# Patient Record
Sex: FEMALE | Race: WHITE | Employment: UNEMPLOYED | URBAN - METROPOLITAN AREA
[De-identification: names, ages, dates, MRNs, and addresses within clinical notes are randomized per-mention and may not be internally consistent; named-entity substitution may affect disease eponyms.]

---

## 2017-07-27 PROBLEM — R20.0 NUMBNESS AND TINGLING OF RIGHT SIDE OF FACE: Status: ACTIVE | Noted: 2017-07-27

## 2017-07-27 PROBLEM — R51.9 RIGHT FACIAL PAIN: Status: ACTIVE | Noted: 2017-07-27

## 2017-07-27 PROBLEM — R49.0 HOARSENESS: Status: ACTIVE | Noted: 2017-07-27

## 2017-07-27 PROBLEM — H93.13: Status: ACTIVE | Noted: 2017-07-27

## 2017-07-27 PROBLEM — R20.2 NUMBNESS AND TINGLING OF RIGHT SIDE OF FACE: Status: ACTIVE | Noted: 2017-07-27

## 2017-07-27 PROCEDURE — 86618 LYME DISEASE ANTIBODY: CPT | Performed by: OTHER

## 2017-07-27 PROCEDURE — 83921 ORGANIC ACID SINGLE QUANT: CPT | Performed by: OTHER

## 2017-07-27 PROCEDURE — 82607 VITAMIN B-12: CPT | Performed by: OTHER

## 2017-07-27 PROCEDURE — 84425 ASSAY OF VITAMIN B-1: CPT | Performed by: OTHER

## 2017-07-27 PROCEDURE — 82746 ASSAY OF FOLIC ACID SERUM: CPT | Performed by: OTHER

## 2017-07-27 PROCEDURE — 80156 ASSAY CARBAMAZEPINE TOTAL: CPT | Performed by: FAMILY MEDICINE

## 2017-07-27 PROCEDURE — 80156 ASSAY CARBAMAZEPINE TOTAL: CPT | Performed by: OTHER

## 2017-08-07 PROBLEM — E53.8 LOW FOLIC ACID: Status: ACTIVE | Noted: 2017-08-07

## 2018-08-02 PROCEDURE — 82746 ASSAY OF FOLIC ACID SERUM: CPT | Performed by: FAMILY MEDICINE

## 2018-08-02 PROCEDURE — 82607 VITAMIN B-12: CPT | Performed by: FAMILY MEDICINE

## 2019-10-04 PROBLEM — E11.9 TYPE 2 DIABETES MELLITUS WITHOUT COMPLICATION, WITHOUT LONG-TERM CURRENT USE OF INSULIN (HCC): Status: ACTIVE | Noted: 2019-10-04

## 2019-10-04 PROBLEM — E11.65 TYPE 2 DIABETES MELLITUS WITH HYPERGLYCEMIA, WITHOUT LONG-TERM CURRENT USE OF INSULIN (HCC): Status: ACTIVE | Noted: 2019-10-04

## 2019-12-19 ENCOUNTER — OFFICE VISIT (OUTPATIENT)
Dept: PODIATRY CLINIC | Facility: CLINIC | Age: 59
End: 2019-12-19
Payer: COMMERCIAL

## 2019-12-19 DIAGNOSIS — M19.172 POST-TRAUMATIC OSTEOARTHRITIS OF LEFT FOOT: ICD-10-CM

## 2019-12-19 DIAGNOSIS — E11.65 TYPE 2 DIABETES MELLITUS WITH HYPERGLYCEMIA, WITHOUT LONG-TERM CURRENT USE OF INSULIN (HCC): ICD-10-CM

## 2019-12-19 DIAGNOSIS — M79.671 BILATERAL FOOT PAIN: Primary | ICD-10-CM

## 2019-12-19 DIAGNOSIS — M79.672 BILATERAL FOOT PAIN: Primary | ICD-10-CM

## 2019-12-19 PROCEDURE — 73620 X-RAY EXAM OF FOOT: CPT | Performed by: PODIATRIST

## 2019-12-19 PROCEDURE — 99213 OFFICE O/P EST LOW 20 MIN: CPT | Performed by: PODIATRIST

## 2019-12-19 NOTE — PROGRESS NOTES
Leatha Fuentes is a 59year old female. Patient presents with:   Foot Pain: Patient present with pain in left foot - pain 7/10  Orthotic Status: Patient needs new orthotics        HPI:     Presents today she has had this left foot pain develop over kevin type diabetes mellitus without mention of complication, not stated as uncontrolled    • Unspecified disorder of thyroid    • Unspecified sleep apnea PSG EDWARD 12-26-10 TX 11-20-13    AHI 12.8 SaO2 aleta 64% CPAP 8 Premier      Past Surgical History:   Pro exertion  GI: denies abdominal pain and denies heartburn  NEURO: denies headaches  MUSCULO: Patient acknowledges arthritis    EXAM:   LMP 03/26/2013   Physical Exam  GENERAL: well developed, well nourished, in no apparent distress  EXTREMITIES:   1.  Integu

## 2019-12-26 ENCOUNTER — OFFICE VISIT (OUTPATIENT)
Dept: PODIATRY CLINIC | Facility: CLINIC | Age: 59
End: 2019-12-26
Payer: COMMERCIAL

## 2019-12-26 DIAGNOSIS — M79.671 BILATERAL FOOT PAIN: ICD-10-CM

## 2019-12-26 DIAGNOSIS — M79.672 BILATERAL FOOT PAIN: ICD-10-CM

## 2019-12-26 DIAGNOSIS — E11.65 TYPE 2 DIABETES MELLITUS WITH HYPERGLYCEMIA, WITHOUT LONG-TERM CURRENT USE OF INSULIN (HCC): Primary | ICD-10-CM

## 2019-12-26 DIAGNOSIS — M19.172 POST-TRAUMATIC OSTEOARTHRITIS OF LEFT FOOT: ICD-10-CM

## 2019-12-26 PROCEDURE — L3000 FT INSERT UCB BERKELEY SHELL: HCPCS | Performed by: PODIATRIST

## 2019-12-26 PROCEDURE — 29799 UNLISTED PX CASTING/STRPG: CPT | Performed by: PODIATRIST

## 2019-12-26 NOTE — PROGRESS NOTES
Og Stack is a 62year old female. Patient presents with:  Orthotic Status: Patient is here to be casted for custom orthotics. Patient spoke with her insurance and agrees.         HPI:     .  Here for follow-up having pain secondary to arthritis i AHI 12.8 SaO2 aleta 64% CPAP 8 Premier      Past Surgical History:   Procedure Laterality Date   • BENIGN BIOPSY LEFT  2008   • D & C  08/93    SAB   • FOOT/TOES SURGERY PROC UNLISTED  2009    broken foot pinned   • HX BREAST CANCER  2006    DCIS   • HY developed, well nourished, in no apparent distress  EXTREMITIES:   1. Integument: Normal skin temperature and turgor  2.  Vascular: Dorsalis pedis two out of four bilateral and posterior tibial pulses two out of   four bilateral, capillary refill normal.

## 2020-01-24 ENCOUNTER — TELEPHONE (OUTPATIENT)
Dept: PODIATRY CLINIC | Facility: CLINIC | Age: 60
End: 2020-01-24

## 2020-01-24 NOTE — TELEPHONE ENCOUNTER
Contacted patient to let her know orthotics are in the office. She will stop by the office and pick them up.

## 2020-01-29 ENCOUNTER — TELEPHONE (OUTPATIENT)
Dept: PODIATRY CLINIC | Facility: CLINIC | Age: 60
End: 2020-01-29

## 2021-08-25 ENCOUNTER — APPOINTMENT (OUTPATIENT)
Dept: CT IMAGING | Facility: HOSPITAL | Age: 61
End: 2021-08-25
Attending: EMERGENCY MEDICINE
Payer: COMMERCIAL

## 2021-08-25 ENCOUNTER — APPOINTMENT (OUTPATIENT)
Dept: GENERAL RADIOLOGY | Facility: HOSPITAL | Age: 61
End: 2021-08-25
Attending: EMERGENCY MEDICINE
Payer: COMMERCIAL

## 2021-08-25 ENCOUNTER — HOSPITAL ENCOUNTER (EMERGENCY)
Facility: HOSPITAL | Age: 61
Discharge: HOME | End: 2021-08-25
Attending: EMERGENCY MEDICINE
Payer: COMMERCIAL

## 2021-08-25 VITALS
BODY MASS INDEX: 45.99 KG/M2 | SYSTOLIC BLOOD PRESSURE: 136 MMHG | TEMPERATURE: 98 F | DIASTOLIC BLOOD PRESSURE: 68 MMHG | RESPIRATION RATE: 19 BRPM | HEIGHT: 67 IN | OXYGEN SATURATION: 96 % | HEART RATE: 66 BPM | WEIGHT: 293 LBS

## 2021-08-25 DIAGNOSIS — I48.92 ATRIAL FLUTTER WITH RAPID VENTRICULAR RESPONSE (HCC): Primary | ICD-10-CM

## 2021-08-25 LAB
ALBUMIN SERPL-MCNC: 3.2 G/DL (ref 3.4–5)
ALBUMIN/GLOB SERPL: 0.8 {RATIO} (ref 1–2)
ALP LIVER SERPL-CCNC: 79 U/L
ALT SERPL-CCNC: 32 U/L
ANION GAP SERPL CALC-SCNC: 3 MMOL/L (ref 0–18)
APTT PPP: 23.5 SECONDS (ref 25.4–36.1)
AST SERPL-CCNC: 17 U/L (ref 15–37)
ATRIAL RATE: 258 BPM
ATRIAL RATE: 71 BPM
BASOPHILS # BLD AUTO: 0.09 X10(3) UL (ref 0–0.2)
BASOPHILS NFR BLD AUTO: 1.1 %
BILIRUB SERPL-MCNC: 0.4 MG/DL (ref 0.1–2)
BUN BLD-MCNC: 13 MG/DL (ref 7–18)
CALCIUM BLD-MCNC: 8.7 MG/DL (ref 8.5–10.1)
CHLORIDE SERPL-SCNC: 109 MMOL/L (ref 98–112)
CO2 SERPL-SCNC: 25 MMOL/L (ref 21–32)
CREAT BLD-MCNC: 0.73 MG/DL
D-DIMER: 0.76 UG/ML FEU (ref ?–0.62)
EOSINOPHIL # BLD AUTO: 0.43 X10(3) UL (ref 0–0.7)
EOSINOPHIL NFR BLD AUTO: 5.2 %
ERYTHROCYTE [DISTWIDTH] IN BLOOD BY AUTOMATED COUNT: 13.1 %
GLOBULIN PLAS-MCNC: 3.8 G/DL (ref 2.8–4.4)
GLUCOSE BLD-MCNC: 198 MG/DL (ref 70–99)
HCT VFR BLD AUTO: 46.2 %
HGB BLD-MCNC: 15.6 G/DL
IMM GRANULOCYTES # BLD AUTO: 0.02 X10(3) UL (ref 0–1)
IMM GRANULOCYTES NFR BLD: 0.2 %
INR BLD: 0.92 (ref 0.89–1.11)
LYMPHOCYTES # BLD AUTO: 2.28 X10(3) UL (ref 1–4)
LYMPHOCYTES NFR BLD AUTO: 27.8 %
M PROTEIN MFR SERPL ELPH: 7 G/DL (ref 6.4–8.2)
MCH RBC QN AUTO: 30.8 PG (ref 26–34)
MCHC RBC AUTO-ENTMCNC: 33.8 G/DL (ref 31–37)
MCV RBC AUTO: 91.3 FL
MONOCYTES # BLD AUTO: 0.7 X10(3) UL (ref 0.1–1)
MONOCYTES NFR BLD AUTO: 8.5 %
NEUTROPHILS # BLD AUTO: 4.69 X10 (3) UL (ref 1.5–7.7)
NEUTROPHILS # BLD AUTO: 4.69 X10(3) UL (ref 1.5–7.7)
NEUTROPHILS NFR BLD AUTO: 57.2 %
NT-PROBNP SERPL-MCNC: 514 PG/ML (ref ?–125)
OSMOLALITY SERPL CALC.SUM OF ELEC: 290 MOSM/KG (ref 275–295)
P AXIS: 37 DEGREES
P AXIS: 47 DEGREES
P-R INTERVAL: 140 MS
PLATELET # BLD AUTO: 177 10(3)UL (ref 150–450)
POTASSIUM SERPL-SCNC: 4.3 MMOL/L (ref 3.5–5.1)
PSA SERPL DL<=0.01 NG/ML-MCNC: 12.5 SECONDS (ref 12.2–14.5)
Q-T INTERVAL: 308 MS
Q-T INTERVAL: 398 MS
QRS DURATION: 88 MS
QRS DURATION: 90 MS
QTC CALCULATION (BEZET): 432 MS
QTC CALCULATION (BEZET): 451 MS
R AXIS: -9 DEGREES
R AXIS: 1 DEGREES
RBC # BLD AUTO: 5.06 X10(6)UL
SARS-COV-2 RNA RESP QL NAA+PROBE: NOT DETECTED
SODIUM SERPL-SCNC: 137 MMOL/L (ref 136–145)
T AXIS: 16 DEGREES
T AXIS: 45 DEGREES
TROPONIN I SERPL-MCNC: <0.045 NG/ML (ref ?–0.04)
TSI SER-ACNC: 2.93 MIU/ML (ref 0.36–3.74)
VENTRICULAR RATE: 129 BPM
VENTRICULAR RATE: 71 BPM
WBC # BLD AUTO: 8.2 X10(3) UL (ref 4–11)

## 2021-08-25 PROCEDURE — 70450 CT HEAD/BRAIN W/O DYE: CPT | Performed by: EMERGENCY MEDICINE

## 2021-08-25 PROCEDURE — 85025 COMPLETE CBC W/AUTO DIFF WBC: CPT | Performed by: EMERGENCY MEDICINE

## 2021-08-25 PROCEDURE — 84484 ASSAY OF TROPONIN QUANT: CPT | Performed by: EMERGENCY MEDICINE

## 2021-08-25 PROCEDURE — 84443 ASSAY THYROID STIM HORMONE: CPT | Performed by: EMERGENCY MEDICINE

## 2021-08-25 PROCEDURE — 99291 CRITICAL CARE FIRST HOUR: CPT | Performed by: EMERGENCY MEDICINE

## 2021-08-25 PROCEDURE — 83880 ASSAY OF NATRIURETIC PEPTIDE: CPT | Performed by: EMERGENCY MEDICINE

## 2021-08-25 PROCEDURE — 93005 ELECTROCARDIOGRAM TRACING: CPT

## 2021-08-25 PROCEDURE — 71260 CT THORAX DX C+: CPT | Performed by: EMERGENCY MEDICINE

## 2021-08-25 PROCEDURE — 80053 COMPREHEN METABOLIC PANEL: CPT | Performed by: EMERGENCY MEDICINE

## 2021-08-25 PROCEDURE — 93010 ELECTROCARDIOGRAM REPORT: CPT | Performed by: EMERGENCY MEDICINE

## 2021-08-25 PROCEDURE — 96366 THER/PROPH/DIAG IV INF ADDON: CPT | Performed by: EMERGENCY MEDICINE

## 2021-08-25 PROCEDURE — 96365 THER/PROPH/DIAG IV INF INIT: CPT

## 2021-08-25 PROCEDURE — 85379 FIBRIN DEGRADATION QUANT: CPT | Performed by: EMERGENCY MEDICINE

## 2021-08-25 PROCEDURE — 85730 THROMBOPLASTIN TIME PARTIAL: CPT | Performed by: EMERGENCY MEDICINE

## 2021-08-25 PROCEDURE — 85610 PROTHROMBIN TIME: CPT | Performed by: EMERGENCY MEDICINE

## 2021-08-25 RX ORDER — DILTIAZEM HYDROCHLORIDE 180 MG/1
180 CAPSULE, COATED, EXTENDED RELEASE ORAL DAILY
Qty: 30 CAPSULE | Refills: 0 | Status: SHIPPED | OUTPATIENT
Start: 2021-08-25 | End: 2021-09-02

## 2021-08-25 RX ORDER — LEVOTHYROXINE SODIUM 0.15 MG/1
150 TABLET ORAL
COMMUNITY

## 2021-08-25 RX ORDER — ONDANSETRON 2 MG/ML
4 INJECTION INTRAMUSCULAR; INTRAVENOUS EVERY 4 HOURS PRN
Status: CANCELLED | OUTPATIENT
Start: 2021-08-25 | End: 2021-08-25

## 2021-08-25 RX ORDER — SODIUM FLUORIDE 5 MG/ML
1 PASTE, DENTIFRICE DENTAL AS DIRECTED
COMMUNITY
Start: 2021-08-14

## 2021-08-25 RX ORDER — ACETAMINOPHEN 500 MG
1000 TABLET ORAL EVERY 6 HOURS PRN
COMMUNITY

## 2021-08-25 NOTE — ED QUICK NOTES
Round on pt. Updated on poc. Will hold on CXR and CT at this time. Per MD, may order CTA pending dimer result.

## 2021-08-25 NOTE — CM/SW NOTE
Received call for Eliquis RX for new onset afib - patient also being prescribed Cardizem - ED MD sent both RXs to [facility] - per pharmacy, there is $0 copay for either medication - meds to be delivered to bedside

## 2021-08-25 NOTE — ED QUICK NOTES
augustd at bs with trace dischg poc; pt verbalized understanding of rx x two and seeing cardiogogist.

## 2021-08-25 NOTE — ED PROVIDER NOTES
Patient Seen in: [facility] Emergency Department      History   Patient presents with:  Arrythmia/Palpitations    Stated Complaint: HEART FLUTTER/PALPITATIONS    HPI/Subjective:   HPI    17-year-old female with past medical history of diabetes, hype Social History    Tobacco Use      Smoking status: Never Smoker      Smokeless tobacco: Never Used    Alcohol use: Yes      Alcohol/week: 0.0 - 1.0 standard drinks      Comment: RARE    Drug use:  No             Review of Systems    Positive ED Course     Labs Reviewed   COMP METABOLIC PANEL (14) - Abnormal; Notable for the following components:       Result Value    Glucose 198 (*)     Albumin 3.2 (*)     A/G Ratio 0.8 (*)     All other components within normal limits   PRO BETA MAURA transcribed by Technologist)  The patient complaints of headache and heart palpitation. FINDINGS:  VENTRICLES/SULCI:  Ventricles and sulci are normal in size. INTRACRANIAL:  There are no abnormal extraaxial fluid collections.   There is no midline shift embolism.  2. There is no acute abnormality in the chest.   Dictated by (CST): Adriel Guerrero MD on 8/25/2021 at 11:42 AM     Finalized by (CST): Adriel Guerrero MD on 8/25/2021 at 11:45 AM              MDM      18-year-old female presents today for palpitatio limits. Patient still feels comfortable with discharge. She will follow up with cardiology in the next week and return for any new or worsening symptoms. Patient was provided with diltiazem and Eliquis in the emergency department.     A total of 30 minut

## 2021-09-09 ENCOUNTER — OFFICE VISIT (OUTPATIENT)
Dept: PODIATRY CLINIC | Facility: CLINIC | Age: 61
End: 2021-09-09
Payer: COMMERCIAL

## 2021-09-09 DIAGNOSIS — E11.65 TYPE 2 DIABETES MELLITUS WITH HYPERGLYCEMIA, WITHOUT LONG-TERM CURRENT USE OF INSULIN (HCC): ICD-10-CM

## 2021-09-09 DIAGNOSIS — M79.671 BILATERAL FOOT PAIN: ICD-10-CM

## 2021-09-09 DIAGNOSIS — M79.672 BILATERAL FOOT PAIN: ICD-10-CM

## 2021-09-09 DIAGNOSIS — M19.172 POST-TRAUMATIC OSTEOARTHRITIS OF LEFT FOOT: Primary | ICD-10-CM

## 2021-09-09 PROCEDURE — L3000 FT INSERT UCB BERKELEY SHELL: HCPCS | Performed by: PODIATRIST

## 2021-09-09 PROCEDURE — 29799 UNLISTED PX CASTING/STRPG: CPT | Performed by: PODIATRIST

## 2021-09-09 NOTE — PROGRESS NOTES
Tab Jaeger is a 56year old female. Patient presents with:  Orthotic Status:  order new orthotics         HPI:   Patient returns to the clinic to be casted for functional orthotics.   At today's visit reviewed nurse's history as taken above, allerg Unspecified sleep apnea PSG EDWARD 12-26-10 TX 11-20-13    AHI 12.8 SaO2 aleta 64% CPAP 8 Premier      Past Surgical History:   Procedure Laterality Date   • BENIGN BIOPSY LEFT  2008   • D & C  08/93    SAB   • FOOT/TOES SURGERY PROC UNLISTED  2009    iwona orders for this visit:    Post-traumatic osteoarthritis of left foot    Bilateral foot pain    Type 2 diabetes mellitus with hyperglycemia, without long-term current use of insulin ([facility] [address]        Plan: Follow-up when orthotics arrive.     The patient indicate

## 2021-09-09 NOTE — PROGRESS NOTES
Patient signed waiver for Orthotics. Insurance notified patient aware of cost and coverage out of pocket if necessary.

## 2021-10-08 ENCOUNTER — TELEPHONE (OUTPATIENT)
Dept: PODIATRY CLINIC | Facility: CLINIC | Age: 61
End: 2021-10-08

## 2021-10-25 PROBLEM — I48.92 PAROXYSMAL ATRIAL FLUTTER (HCC): Status: ACTIVE | Noted: 2021-08-25

## 2022-07-12 ENCOUNTER — OFFICE VISIT (OUTPATIENT)
Dept: PODIATRY CLINIC | Facility: CLINIC | Age: 62
End: 2022-07-12
Payer: COMMERCIAL

## 2022-07-12 DIAGNOSIS — M20.12 HALLUX VALGUS OF LEFT FOOT: ICD-10-CM

## 2022-07-12 DIAGNOSIS — L84 CALLUS OF FOOT: ICD-10-CM

## 2022-07-12 DIAGNOSIS — B35.1 ONYCHOMYCOSIS: Primary | ICD-10-CM

## 2022-07-12 DIAGNOSIS — M79.672 LEFT FOOT PAIN: ICD-10-CM

## 2022-07-12 PROCEDURE — 99213 OFFICE O/P EST LOW 20 MIN: CPT | Performed by: PODIATRIST

## 2022-07-21 ENCOUNTER — LAB ENCOUNTER (OUTPATIENT)
Dept: LAB | Age: 62
End: 2022-07-21
Attending: PODIATRIST
Payer: COMMERCIAL

## 2022-07-21 ENCOUNTER — TELEPHONE (OUTPATIENT)
Dept: PODIATRY CLINIC | Facility: CLINIC | Age: 62
End: 2022-07-21

## 2022-07-21 DIAGNOSIS — B35.1 ONYCHOMYCOSIS: ICD-10-CM

## 2022-07-21 DIAGNOSIS — B35.1 ONYCHOMYCOSIS: Primary | ICD-10-CM

## 2022-07-21 LAB
ALBUMIN SERPL-MCNC: 3.4 G/DL (ref 3.4–5)
ALP LIVER SERPL-CCNC: 75 U/L
ALT SERPL-CCNC: 29 U/L
AST SERPL-CCNC: 18 U/L (ref 15–37)
BILIRUB DIRECT SERPL-MCNC: <0.1 MG/DL (ref 0–0.2)
BILIRUB SERPL-MCNC: 0.3 MG/DL (ref 0.1–2)
PROT SERPL-MCNC: 7.3 G/DL (ref 6.4–8.2)

## 2022-07-21 PROCEDURE — 36415 COLL VENOUS BLD VENIPUNCTURE: CPT

## 2022-07-21 PROCEDURE — 80076 HEPATIC FUNCTION PANEL: CPT

## 2022-07-25 ENCOUNTER — TELEPHONE (OUTPATIENT)
Dept: PODIATRY CLINIC | Facility: CLINIC | Age: 62
End: 2022-07-25

## 2022-07-25 RX ORDER — TERBINAFINE HYDROCHLORIDE 250 MG/1
250 TABLET ORAL DAILY
Qty: 45 TABLET | Refills: 0 | Status: SHIPPED | OUTPATIENT
Start: 2022-07-25

## 2022-08-04 ENCOUNTER — HOSPITAL ENCOUNTER (OUTPATIENT)
Dept: MRI IMAGING | Age: 62
Discharge: HOME | End: 2022-08-04
Attending: FAMILY MEDICINE
Payer: COMMERCIAL

## 2022-08-04 DIAGNOSIS — M54.2 CERVICALGIA: ICD-10-CM

## 2022-08-04 DIAGNOSIS — M75.102 NONTRAUMATIC TEAR OF LEFT ROTATOR CUFF, UNSPECIFIED TEAR EXTENT: ICD-10-CM

## 2022-08-04 PROCEDURE — 73221 MRI JOINT UPR EXTREM W/O DYE: CPT | Performed by: FAMILY MEDICINE

## 2022-09-19 ENCOUNTER — OFFICE VISIT (OUTPATIENT)
Dept: PODIATRY CLINIC | Facility: CLINIC | Age: 62
End: 2022-09-19
Payer: COMMERCIAL

## 2022-09-19 DIAGNOSIS — B35.1 ONYCHOMYCOSIS: Primary | ICD-10-CM

## 2022-09-19 PROCEDURE — 99213 OFFICE O/P EST LOW 20 MIN: CPT | Performed by: PODIATRIST

## 2022-09-20 ENCOUNTER — TELEPHONE (OUTPATIENT)
Dept: PODIATRY CLINIC | Facility: CLINIC | Age: 62
End: 2022-09-20

## 2022-09-20 ENCOUNTER — LAB ENCOUNTER (OUTPATIENT)
Dept: LAB | Age: 62
End: 2022-09-20
Attending: PODIATRIST

## 2022-09-20 DIAGNOSIS — B35.1 ONYCHOMYCOSIS: ICD-10-CM

## 2022-09-20 LAB
ALBUMIN SERPL-MCNC: 3.3 G/DL (ref 3.4–5)
ALP LIVER SERPL-CCNC: 67 U/L
ALT SERPL-CCNC: 37 U/L
AST SERPL-CCNC: 19 U/L (ref 15–37)
BILIRUB DIRECT SERPL-MCNC: 0.1 MG/DL (ref 0–0.2)
BILIRUB SERPL-MCNC: 0.3 MG/DL (ref 0.1–2)
PROT SERPL-MCNC: 7 G/DL (ref 6.4–8.2)

## 2022-09-20 PROCEDURE — 36415 COLL VENOUS BLD VENIPUNCTURE: CPT

## 2022-09-20 PROCEDURE — 80076 HEPATIC FUNCTION PANEL: CPT

## 2022-09-20 RX ORDER — TERBINAFINE HYDROCHLORIDE 250 MG/1
250 TABLET ORAL DAILY
Qty: 45 TABLET | Refills: 0 | Status: SHIPPED | OUTPATIENT
Start: 2022-09-20

## 2022-12-19 ENCOUNTER — OFFICE VISIT (OUTPATIENT)
Dept: PODIATRY CLINIC | Facility: CLINIC | Age: 62
End: 2022-12-19
Payer: COMMERCIAL

## 2022-12-19 DIAGNOSIS — B35.1 ONYCHOMYCOSIS: Primary | ICD-10-CM

## 2022-12-19 DIAGNOSIS — E11.65 TYPE 2 DIABETES MELLITUS WITH HYPERGLYCEMIA, WITHOUT LONG-TERM CURRENT USE OF INSULIN (HCC): ICD-10-CM

## 2022-12-19 PROCEDURE — 99213 OFFICE O/P EST LOW 20 MIN: CPT | Performed by: PODIATRIST

## 2022-12-19 RX ORDER — LEVOTHYROXINE SODIUM 137 UG/1
137 TABLET ORAL
COMMUNITY
Start: 2022-12-01

## 2023-09-07 RX ORDER — LISINOPRIL 20 MG/1
20 TABLET ORAL 2 TIMES DAILY
COMMUNITY

## 2023-09-21 ENCOUNTER — OFFICE VISIT (OUTPATIENT)
Dept: PODIATRY CLINIC | Facility: CLINIC | Age: 63
End: 2023-09-21

## 2023-09-21 DIAGNOSIS — M79.672 LEFT FOOT PAIN: Primary | ICD-10-CM

## 2023-09-21 DIAGNOSIS — M72.2 PLANTAR FASCIITIS OF LEFT FOOT: ICD-10-CM

## 2023-09-21 DIAGNOSIS — L84 CALLUS OF FOOT: ICD-10-CM

## 2023-09-21 DIAGNOSIS — M20.12 HALLUX VALGUS OF LEFT FOOT: ICD-10-CM

## 2023-09-21 DIAGNOSIS — M79.672 INFLAMMATORY HEEL PAIN, LEFT: ICD-10-CM

## 2023-09-21 DIAGNOSIS — M79.671 INFLAMMATORY HEEL PAIN, RIGHT: ICD-10-CM

## 2023-09-21 DIAGNOSIS — M72.2 PLANTAR FASCIITIS OF RIGHT FOOT: ICD-10-CM

## 2023-09-21 PROCEDURE — 99213 OFFICE O/P EST LOW 20 MIN: CPT | Performed by: PODIATRIST

## 2023-09-28 ENCOUNTER — ANESTHESIA (OUTPATIENT)
Dept: ENDOSCOPY | Facility: HOSPITAL | Age: 63
End: 2023-09-28
Payer: COMMERCIAL

## 2023-09-28 ENCOUNTER — ANESTHESIA EVENT (OUTPATIENT)
Dept: ENDOSCOPY | Facility: HOSPITAL | Age: 63
End: 2023-09-28
Payer: COMMERCIAL

## 2023-09-28 ENCOUNTER — HOSPITAL ENCOUNTER (OUTPATIENT)
Facility: HOSPITAL | Age: 63
Setting detail: HOSPITAL OUTPATIENT SURGERY
Discharge: HOME | End: 2023-09-28
Attending: INTERNAL MEDICINE | Admitting: INTERNAL MEDICINE
Payer: COMMERCIAL

## 2023-09-28 VITALS
DIASTOLIC BLOOD PRESSURE: 58 MMHG | HEART RATE: 55 BPM | WEIGHT: 292 LBS | OXYGEN SATURATION: 100 % | RESPIRATION RATE: 18 BRPM | BODY MASS INDEX: 45.83 KG/M2 | SYSTOLIC BLOOD PRESSURE: 104 MMHG | HEIGHT: 67 IN | TEMPERATURE: 98 F

## 2023-09-28 LAB — GLUCOSE BLD-MCNC: 96 MG/DL (ref 70–99)

## 2023-09-28 PROCEDURE — 82962 GLUCOSE BLOOD TEST: CPT

## 2023-09-28 PROCEDURE — 0DJD8ZZ INSPECTION OF LOWER INTESTINAL TRACT, VIA NATURAL OR ARTIFICIAL OPENING ENDOSCOPIC: ICD-10-PCS | Performed by: INTERNAL MEDICINE

## 2023-09-28 RX ORDER — NICOTINE POLACRILEX 4 MG
15 LOZENGE BUCCAL
Status: DISCONTINUED | OUTPATIENT
Start: 2023-09-28 | End: 2023-09-28

## 2023-09-28 RX ORDER — SODIUM CHLORIDE, SODIUM LACTATE, POTASSIUM CHLORIDE, CALCIUM CHLORIDE 600; 310; 30; 20 MG/100ML; MG/100ML; MG/100ML; MG/100ML
INJECTION, SOLUTION INTRAVENOUS CONTINUOUS
Status: DISCONTINUED | OUTPATIENT
Start: 2023-09-28 | End: 2023-09-28

## 2023-09-28 RX ORDER — LIDOCAINE HYDROCHLORIDE 10 MG/ML
INJECTION, SOLUTION EPIDURAL; INFILTRATION; INTRACAUDAL; PERINEURAL AS NEEDED
Status: DISCONTINUED | OUTPATIENT
Start: 2023-09-28 | End: 2023-09-28 | Stop reason: SURG

## 2023-09-28 RX ORDER — LABETALOL HYDROCHLORIDE 5 MG/ML
5 INJECTION, SOLUTION INTRAVENOUS EVERY 5 MIN PRN
Status: DISCONTINUED | OUTPATIENT
Start: 2023-09-28 | End: 2023-09-28

## 2023-09-28 RX ORDER — NICOTINE POLACRILEX 4 MG
30 LOZENGE BUCCAL
Status: DISCONTINUED | OUTPATIENT
Start: 2023-09-28 | End: 2023-09-28

## 2023-09-28 RX ORDER — METOCLOPRAMIDE HYDROCHLORIDE 5 MG/ML
10 INJECTION INTRAMUSCULAR; INTRAVENOUS AS NEEDED
Status: DISCONTINUED | OUTPATIENT
Start: 2023-09-28 | End: 2023-09-28

## 2023-09-28 RX ORDER — NALOXONE HYDROCHLORIDE 0.4 MG/ML
80 INJECTION, SOLUTION INTRAMUSCULAR; INTRAVENOUS; SUBCUTANEOUS AS NEEDED
Status: DISCONTINUED | OUTPATIENT
Start: 2023-09-28 | End: 2023-09-28

## 2023-09-28 RX ORDER — DEXTROSE MONOHYDRATE 25 G/50ML
50 INJECTION, SOLUTION INTRAVENOUS
Status: DISCONTINUED | OUTPATIENT
Start: 2023-09-28 | End: 2023-09-28

## 2023-09-28 RX ORDER — ONDANSETRON 2 MG/ML
4 INJECTION INTRAMUSCULAR; INTRAVENOUS AS NEEDED
Status: DISCONTINUED | OUTPATIENT
Start: 2023-09-28 | End: 2023-09-28

## 2023-09-28 RX ADMIN — LIDOCAINE HYDROCHLORIDE 50 MG: 10 INJECTION, SOLUTION EPIDURAL; INFILTRATION; INTRACAUDAL; PERINEURAL at 11:06:00

## 2023-09-28 RX ADMIN — SODIUM CHLORIDE, SODIUM LACTATE, POTASSIUM CHLORIDE, CALCIUM CHLORIDE: 600; 310; 30; 20 INJECTION, SOLUTION INTRAVENOUS at 11:32:00

## 2023-09-28 RX ADMIN — SODIUM CHLORIDE, SODIUM LACTATE, POTASSIUM CHLORIDE, CALCIUM CHLORIDE: 600; 310; 30; 20 INJECTION, SOLUTION INTRAVENOUS at 11:06:00

## 2023-09-28 NOTE — OPERATIVE REPORT
Colonoscopy Operative Report    Ca Abraham Patient Status:  Hospital Outpatient Surgery    1960 MRN [id number]   [facility] [address] [facility] Attending Zain Narvaez MD   Hosp Day #   0 [facility],      Pre-Operative Diagnosis: H/o colon polyps, chronic anticoagulation    Post-Operative Diagnosis:  - Challenging procedure due to marked colonic tortuosity/redundancy + impacts of truncal obesity, successfully traversed to the cecum via gentle manual abdominal pressure. - Medium-sized Grade 2 internal hemorrhoids with hypertrophied anal papillae + contiguous anal outlet skin tags. - Sparse pan-colonic diverticulosis. - Normal colon. Procedure Performed: COLONOSCOPY       Informed Consent: Informed consent for both the procedure and sedation were obtained from the patient. The potentially life-threatening complications of sedation, bleeding,  perforation, transfusion or repeat endoscopy  were reviewed along with the possible need for hospitalization, surgical management, transfusion or repeat endoscopy should one of these complications arise. The patient understands and is agreeable to proceed. Sedation Type: MAC. Patient received sedation with monitored anesthesia provided by an Anesthesiologist.  Moderate Sedation Time: None. Deep sedation provided by Anesthesia. Cecum Withdrawal Time:  12 minutes    Date of previous colonoscopy:     Procedure Description: The patient was placed in the left lateral decubitus position. After careful digital rectal examination, the Pediatric colonoscope was inserted into the rectum and advanced to the level of the cecum under direct visualization. The cecum was identified by landmarks, including the appendiceal orifice and ileocecal valve. Careful examination of the entire colon was performed during withdrawal of the endoscope.  The scope was withdrawn to the rectum and retroflexion was performed. The patient tolerated the procedure well with no immediate complications. The patient was transferred to the recovery area in stable condition. Quality of Preparation: Adequate    Aronchick Bowel Prep Scale:  2    Complications:  No immediate complications noted. COL Findings:  - Challenging procedure due to marked colonic tortuosity/redundancy + impacts of truncal obesity, successfully traversed to the cecum via gentle manual abdominal pressure. - Medium-sized Grade 2 internal hemorrhoids with hypertrophied anal papillae + contiguous anal outlet skin tags. - Sparse pan-colonic diverticulosis. - Normal colon. Recommendations:  - Repeat Colonoscopy for screening in 10 years unless symptomatic sooner. - May resume anticoagulation today or tomorrow. - Return to primary care provider as advised. - Findings were discussed with the patient and requested family/acquaintance today following procedure. Discharge: The patient was given an after visit summary detailing the procedure, findings, recommendations and follow up plans.      Jan Roca MD  9/28/2023  10:08 AM

## 2023-09-28 NOTE — H&P
History and Physical for Endoscopic Procedure      Adelina Smith Patient Status:  Hospital Outpatient Surgery    1960 MRN [id number]   Location [address] [facility] 28 Attending Leatha Ochoa MD   Hosp Day # 0 PCP Loraine Bergeron DO     Date of Consult:  2023     Reason for Consultation:  H/o colon polyps, chronic anticoagulation    History of Present Illness: Adelina Smith is a a(n) 57year old female. History:  Past Medical History:   Diagnosis Date    Arrhythmia     a fib    Breast cancer ([address] 2006    left breast dcis    Cancer ([facility] [address]     dcis left breast 2006    DCIS (ductal carcinoma in situ) of breast 2006    Exposure to medical diagnostic radiation 2006    High blood pressure     High cholesterol     HYPERLIPIDEMIA     HYPERTENSION     MENOPAUSE     Neuropathy     OBESITY     Obesity, unspecified     Other and unspecified hyperlipidemia     SLEEP APNEA ED DX 12-26-10    AHI 12.8  SaO2 64%    Type II or unspecified type diabetes mellitus without mention of complication, not stated as uncontrolled     Unspecified disorder of thyroid     Unspecified sleep apnea PSG EDWARD 12-26-10 TX 13    AHI 12.8 SaO2 aleta 64% CPAP 8 Premier     Past Surgical History:   Procedure Laterality Date    D & C      SAB    FOOT/TOES SURGERY PROC UNLISTED      broken foot pinned    HX BREAST CANCER  2006    DCIS    HYSTERECTOMY  2013    LUMPECTOMY LEFT  2006    DCIS    NEEDLE BIOPSY LEFT  2008    ORTHOPEDIC SURG (PBP)      frx l foot/hardware    OTHER SURGICAL HISTORY  2009    foot surgery/pin    PELVIS LAPAROSCOPY,DX      RADIATION LEFT       Family History   Problem Relation Age of Onset    Heart Disorder Father     Cancer Father         lymphoma    Hypertension Mother     Lipids Mother     Psychiatric Mother         alzheimers    Breast Cancer Other     Heart Disorder Sister 48    Heart Disorder Brother 50      reports that she has never smoked.  She has never used smokeless tobacco. She reports current alcohol use. She reports that she does not use drugs. Allergies:  No Known Allergies    Medications:  No current facility-administered medications for this encounter.     Review of Systems:  Gastrointestinal: negative other than specified in the HPI  General: negative other than specified in the HPI  Neurological: negative other than specified in the HPI  Cardiovascular: negative other than specified in the HPI  Respiratory: negative other than specified in the HPI    Physical Exam:  No acute distress  RRR  CTA B/L  SOFT +BS    Assessment/Plan:  Patient Active Problem List:     Acid reflux     Plantar fasciitis     Hypothyroid     Ductal carcinoma in situ of breast     Closed fracture of metatarsal bone(s)     Chondromalacia     Cervical spine pain     Pain in joint, lower leg     MAGEN (obstructive sleep apnea)     Right facial pain     Numbness and tingling of right side of face     Hoarseness     Ear noise/buzzing, bilateral     Low folic acid     Type 2 diabetes mellitus with hyperglycemia, without long-term current use of insulin (HCC)     Type 2 diabetes mellitus without complication, without long-term current use of insulin (HCC)     Paroxysmal atrial flutter (HCC)      COL today    Teresita Hackett MD  9/28/2023  8:43 AM

## 2023-09-28 NOTE — ANESTHESIA POSTPROCEDURE EVALUATION
[facility]    Ca Abraham Patient Status:  Hospital Outpatient Surgery   Age/Gender 57year old female MRN [id number]   Location [address] [facility] 28 Attending Zain Narvaez MD   [facility] Day # 0 PCP Maribel Lerner, DO       Anesthesia Post-op Note    COLONOSCOPY    Procedure Summary       Date: 09/28/23 Room / Location: [facility] [facility] ENDOSCOPY 03 / [facility] ENDOSCOPY    Anesthesia Start: 6524 Anesthesia Stop: 3748    Procedure: COLONOSCOPY Diagnosis: (DIVERTICULOSIS, INTERNAL HEMORRHOIDS)    Surgeons: Zain Narvaez MD Anesthesiologist: Lashay Marin MD    Anesthesia Type: MAC ASA Status: 3            Anesthesia Type: MAC    Vitals Value Taken Time   /66 09/28/23 1140   Temp  09/28/23 1143   Pulse 62 09/28/23 1143   Resp 14 09/28/23 1143   SpO2 100 % 09/28/23 1143   Vitals shown include unfiled device data. Patient Location: Endoscopy    Anesthesia Type: MAC    Airway Patency: patent    Postop Pain Control: adequate    Mental Status: preanesthetic baseline    Nausea/Vomiting: none    Cardiopulmonary/Hydration status: stable euvolemic    Complications: no apparent anesthesia related complications    Postop vital signs: stable    Dental Exam: Unchanged from Preop    Patient to be discharged home when criteria met.

## 2023-09-28 NOTE — DISCHARGE INSTRUCTIONS
ENDOSCOPY DISCHARGE INSTRUCTIONS    Procedure Performed:   Colonoscopy    Endoscopist: Teresita Hackett MD    Findings:  COL Findings:  - Challenging procedure due to marked colonic tortuosity/redundancy + impacts of truncal obesity, successfully traversed to the cecum via gentle manual abdominal pressure. - Medium-sized Grade 2 internal hemorrhoids with hypertrophied anal papillae + contiguous anal outlet skin tags. - Sparse pan-colonic diverticulosis. - Normal colon. Recommendations:  - Repeat Colonoscopy for screening in 10 years unless symptomatic sooner. - May resume anticoagulation today or tomorrow. - Return to primary care provider as advised. - Findings were discussed with the patient and requested family/acquaintance today following procedure. MEDICATIONS:  You may resume all other medications today and may resume anticoagulation today or tomorrow (whenever due). DIET:  Regular    BIOPSIES:  No biopsies were taken    X-RAYS:   No X-Rays were ordered today        Activity for remainder of today:    REST TODAY  DO NOT drive or operate heavy machinery  DO NOT drink any alcoholic beverages  DO NOT sign any legal documents or make any important decisions  AVOID NSAIDs if possible for 3-5 days following your procedure. After your procedure(s): It is not unusual to feel bloated or gassy . Passing gas and belching is encouraged. Lying on your left side with your knees flexed may relieve the discomfort. A hot pack to the abdomen may also help. After your gastroscopy (upper endoscopy): You may experience a slight sore throat which will subside. Throat lozenges or salt water gargle can be used.     FOLLOW-UP:  Contact the office at [phone] for follow-up appointment is needed or if you develop any of the following:    Severe abdominal pain/discomfort     Excessive bleeding                     Black tarry stool    Difficulty breathing/swallowing      Persistent nausea/vomiting  Fever above 100 degrees or chills

## 2023-09-29 NOTE — PROGRESS NOTES
Leda Davenport is a 57year old female. Patient presents with: Foot Pain: Bilateral foot- onset- yrs ago- had an old injury- per pt she is here requesting for new orthotics- had been wearing orthotics for 30 yrs- rates pain 4/10 most of the time- FBS this AM was not taken- don't check BS daily- also stated L hallux check - stated has skin thickening in the bottom of the toe        HPI:   Patient returns for for follow-up on her Lamisil tablet therapy to see other nails growing out but in addition to that she also wants to get a new pair of orthotics because she has been wearing them for a long time. She can get pain about 4-10 in her feet if she does not wear the orthotics and the ones that she has seem to be wearing out so the pain is beginning to return. At today's visit reviewed nurse's history as taken above, allergies medications and medical history as documented below. All changes duly noted  Allergies: Patient has no known allergies. Current Outpatient Medications   Medication Sig Dispense Refill    rivaroxaban (XARELTO) 20 MG Oral Tab Take 1 tablet (20 mg total) by mouth daily with food. lisinopril 20 MG Oral Tab Take 1 tablet (20 mg total) by mouth 2 (two) times daily. levothyroxine 137 MCG Oral Tab Take 137 mcg by mouth before breakfast.      atorvastatin 20 MG Oral Tab Take 1 tablet (20 mg total) by mouth daily. 90 tablet 2    dilTIAZem HCl ER Coated Beads 180 MG Oral Capsule SR 24 Hr Take 1 capsule (180 mg total) by mouth daily. 90 capsule 3    Microlet Lancets Does not apply Misc Use one per day 100 each 3    Glucose Blood (CONTOUR NEXT TEST) In Vitro Strip Check once a day 100 each 1    metFORMIN HCl  MG Oral Tablet 24 Hr Take 1 tablet (500 mg total) by mouth 2 (two) times daily with meals. 180 tablet 2    Vitamin D3, Cholecalciferol, 50 MCG (2000 UT) Oral Cap Take 1 capsule (2,000 Units total) by mouth once daily.       PREVIDENT 5000 PLUS 1.1 % Dental Cream Take 1 Bottle by mouth As Directed. acetaminophen 500 MG Oral Tab Take 2 tablets (1,000 mg total) by mouth every 6 (six) hours as needed for Pain. Diclofenac Sodium 1 % External Gel Apply topically as needed.         Past Medical History:   Diagnosis Date    Arrhythmia     a fib    Breast cancer ([facility] [address] 2006    left breast dcis    Cancer ([facility] [address]     dcis left breast 2006    DCIS (ductal carcinoma in situ) of breast 2006    Exposure to medical diagnostic radiation 2006    High blood pressure     High cholesterol     HYPERLIPIDEMIA     HYPERTENSION     MENOPAUSE     Neuropathy     OBESITY     Obesity, unspecified     Other and unspecified hyperlipidemia     SLEEP APNEA ED DX 12-26-10    AHI 12.8  SaO2 64%    Type II or unspecified type diabetes mellitus without mention of complication, not stated as uncontrolled     Unspecified disorder of thyroid     Unspecified sleep apnea PSG EDWARD 12-26-10 TX 11-20-13    AHI 12.8 SaO2 aleta 64% CPAP 8 Premier      Past Surgical History:   Procedure Laterality Date    COLONOSCOPY N/A 9/28/2023    Procedure: COLONOSCOPY;  Surgeon: Delphine Ewing MD;  Location: [facility] ENDOSCOPY    D & C  08/93    SAB    FOOT/TOES SURGERY PROC UNLISTED  2009    broken foot pinned    HX BREAST CANCER  2006    DCIS    HYSTERECTOMY  2013    LUMPECTOMY LEFT  4/2006    DCIS    NEEDLE BIOPSY LEFT  12/2008    ORTHOPEDIC SURG (PBP)      frx l foot/hardware    OTHER SURGICAL HISTORY  2009    foot surgery/pin    PELVIS LAPAROSCOPY,DX  1997    RADIATION LEFT        Family History   Problem Relation Age of Onset    Heart Disorder Father     Cancer Father         lymphoma    Hypertension Mother     Lipids Mother     Psychiatric Mother         alzheimers    Breast Cancer Other     Heart Disorder Sister 48    Heart Disorder Brother 48      Social History    Socioeconomic History      Marital status:     Tobacco Use      Smoking status: Never      Smokeless tobacco: Never    Vaping Use      Vaping Use: Never used Substance and Sexual Activity      Alcohol use: Yes        Alcohol/week: 0.0 - 1.0 standard drinks of alcohol        Comment: RARE      Drug use: No      Sexual activity: Yes        Partners: Male          REVIEW OF SYSTEMS:   Today reviewed systens as documented below  GENERAL HEALTH: feels well otherwise  SKIN: Refer to exam below  RESPIRATORY: denies shortness of breath with exertion  CARDIOVASCULAR: denies chest pain on exertion  GI: denies abdominal pain and denies heartburn  NEURO: denies headaches    EXAM:   LMP 01/14/2011   GENERAL: well developed, well nourished, in no apparent distress  EXTREMITIES:   1. Integument: The skin on her feet is warm and dry the right hallux nail still shows quite a bit of thickness. Even proximally there is a new nail growing out. 2. Vascular: Patient has palpable pulses both dorsalis pedis and posterior tibial bilateral   3. Neurologic: Patient has intact sensorium   4. Musculoskeletal: Patient does have some midfoot tenderness but currently has been getting much better not bothering her as much. Dn orthotics were checked against the bottom of her foot orthotics no longer conform to the arch   ASSESSMENT AND PLAN:   Diagnoses and all orders for this visit:    Left foot pain    Hallux valgus of left foot    Callus of foot    Plantar fasciitis of left foot    Plantar fasciitis of right foot    Inflammatory heel pain, left    Inflammatory heel pain, right        Plan: Patient should continue to use Lamisil cream between her toes 1 application 3 times weekly we gave her diagnoses codes today so she could call and see if orthotics are covered and if so she will come back for orthotic casting. The patient indicates understanding of these issues and agrees to the plan.     Ellyn Layne DPM

## 2023-10-11 ENCOUNTER — OFFICE VISIT (OUTPATIENT)
Dept: PODIATRY CLINIC | Facility: CLINIC | Age: 63
End: 2023-10-11

## 2023-10-11 DIAGNOSIS — M72.2 PLANTAR FASCIITIS OF RIGHT FOOT: ICD-10-CM

## 2023-10-11 DIAGNOSIS — M72.2 PLANTAR FASCIITIS OF LEFT FOOT: ICD-10-CM

## 2023-10-11 DIAGNOSIS — M79.672 INFLAMMATORY HEEL PAIN, LEFT: ICD-10-CM

## 2023-10-11 DIAGNOSIS — L84 CALLUS OF FOOT: Primary | ICD-10-CM

## 2023-10-11 DIAGNOSIS — M79.671 INFLAMMATORY HEEL PAIN, RIGHT: ICD-10-CM

## 2023-10-11 PROCEDURE — 29799 UNLISTED PX CASTING/STRPG: CPT | Performed by: PODIATRIST

## 2023-10-11 PROCEDURE — L3000 FT INSERT UCB BERKELEY SHELL: HCPCS | Performed by: PODIATRIST

## 2023-10-21 NOTE — PROGRESS NOTES
Esther Kasper is a 57year old female. Patient presents with: Follow - Up: Pt here for orthotics  casting. HPI:   Patient returns to clinic for orthotic casting. At today's visit reviewed nurse's history as taken above, allergies medications and medical history as documented below. All changes duly noted  Allergies: Patient has no known allergies. Current Outpatient Medications   Medication Sig Dispense Refill    rivaroxaban (XARELTO) 20 MG Oral Tab Take 1 tablet (20 mg total) by mouth daily with food. lisinopril 20 MG Oral Tab Take 1 tablet (20 mg total) by mouth 2 (two) times daily. levothyroxine 137 MCG Oral Tab Take 137 mcg by mouth before breakfast.      atorvastatin 20 MG Oral Tab Take 1 tablet (20 mg total) by mouth daily. 90 tablet 2    dilTIAZem HCl ER Coated Beads 180 MG Oral Capsule SR 24 Hr Take 1 capsule (180 mg total) by mouth daily. 90 capsule 3    Microlet Lancets Does not apply Misc Use one per day 100 each 3    Glucose Blood (CONTOUR NEXT TEST) In Vitro Strip Check once a day 100 each 1    metFORMIN HCl  MG Oral Tablet 24 Hr Take 1 tablet (500 mg total) by mouth 2 (two) times daily with meals. 180 tablet 2    Vitamin D3, Cholecalciferol, 50 MCG (2000 UT) Oral Cap Take 1 capsule (2,000 Units total) by mouth once daily. PREVIDENT 5000 PLUS 1.1 % Dental Cream Take 1 Bottle by mouth As Directed. acetaminophen 500 MG Oral Tab Take 2 tablets (1,000 mg total) by mouth every 6 (six) hours as needed for Pain. Diclofenac Sodium 1 % External Gel Apply topically as needed.         Past Medical History:   Diagnosis Date    Arrhythmia     a fib    Breast cancer ([facility] [address] 2006    left breast dcis    Cancer ([facility] [address]     dcis left breast 2006    DCIS (ductal carcinoma in situ) of breast 2006    Exposure to medical diagnostic radiation 2006    High blood pressure     High cholesterol     HYPERLIPIDEMIA     HYPERTENSION     MENOPAUSE     Neuropathy     OBESITY     Obesity, unspecified     Other and unspecified hyperlipidemia     SLEEP APNEA ED DX 12-26-10    AHI 12.8  SaO2 64%    Type II or unspecified type diabetes mellitus without mention of complication, not stated as uncontrolled     Unspecified disorder of thyroid     Unspecified sleep apnea [facility] 12-26-10 TX 11-20-13    AHI 12.8 SaO2 aleta 64% CPAP 8 Premier      Past Surgical History:   Procedure Laterality Date    COLONOSCOPY N/A 9/28/2023    Procedure: COLONOSCOPY;  Surgeon: Tasha Montes MD;  Location: [address] [place] [address] ENDOSCOPY    D & C  08/93    SAB    FOOT/TOES SURGERY PROC UNLISTED  2009    broken foot pinned    HX BREAST CANCER  2006    DCIS    HYSTERECTOMY  2013    LUMPECTOMY LEFT  4/2006    DCIS    NEEDLE BIOPSY LEFT  12/2008    ORTHOPEDIC SURG ([facility])      frx l foot/hardware    OTHER SURGICAL HISTORY  2009    foot surgery/pin    PELVIS LAPAROSCOPY,DX  1997    RADIATION LEFT        Family History   Problem Relation Age of Onset    Heart Disorder Father     Cancer Father         lymphoma    Hypertension Mother     Lipids Mother     Psychiatric Mother         alzheimers    Breast Cancer Other     Heart Disorder Sister 48    Heart Disorder Brother 48      Social History    Socioeconomic History      Marital status:     Tobacco Use      Smoking status: Never      Smokeless tobacco: Never    Vaping Use      Vaping Use: Never used    Substance and Sexual Activity      Alcohol use:  Yes        Alcohol/week: 0.0 - 1.0 standard drinks of alcohol        Comment: RARE      Drug use: No      Sexual activity: Yes        Partners: Male          REVIEW OF SYSTEMS:   Today reviewed systens as documented below  GENERAL HEALTH: feels well otherwise  SKIN: Refer to exam below  RESPIRATORY: denies shortness of breath with exertion  CARDIOVASCULAR: denies chest pain on exertion  GI: denies abdominal pain and denies heartburn  NEURO: denies headaches    EXAM:   LMP 01/14/2011   GENERAL: well developed, well nourished, in no apparent distress  EXTREMITIES:   At today's visit plaster Gillian splints were used to take molds for both feet while keeping them in their neutral but corrected positions. The molds were examined afterwards and were found to be of good form. ASSESSMENT AND PLAN:   Diagnoses and all orders for this visit:    Callus of foot    Plantar fasciitis of left foot    Plantar fasciitis of right foot    Inflammatory heel pain, left    Inflammatory heel pain, right        Plan: We will continue with stretching follow-up in orthotics arrive. The patient indicates understanding of these issues and agrees to the plan.     Viji Fenton DPM

## 2023-11-01 ENCOUNTER — TELEPHONE (OUTPATIENT)
Dept: PODIATRY CLINIC | Facility: CLINIC | Age: 63
End: 2023-11-01

## 2024-12-30 ENCOUNTER — TELEPHONE (OUTPATIENT)
Dept: PODIATRY CLINIC | Facility: CLINIC | Age: 64
End: 2024-12-30

## 2024-12-30 NOTE — TELEPHONE ENCOUNTER
Patient broke her pinky toe on 12/29 and states she will be out of town until 12/3 and is asking for appointment that day. Please call.

## 2024-12-31 NOTE — TELEPHONE ENCOUNTER
Okay to double book but if it is a closed fracture and it does not seem to be displaced she can follow-up within a week or 2 but she should just keep it taped to the fourth toe and wear wide loosefitting comfortable shoe that does not rub.

## 2025-01-06 ENCOUNTER — OFFICE VISIT (OUTPATIENT)
Dept: PODIATRY CLINIC | Facility: CLINIC | Age: 65
End: 2025-01-06

## 2025-01-06 DIAGNOSIS — S92.502A CLOSED FRACTURE OF PHALANX OF LEFT FIFTH TOE, INITIAL ENCOUNTER: Primary | ICD-10-CM

## 2025-01-06 PROCEDURE — 99213 OFFICE O/P EST LOW 20 MIN: CPT | Performed by: PODIATRIST

## 2025-01-12 NOTE — PROGRESS NOTES
Mali Simon is a 64 year old female.   Chief Complaint   Patient presents with    Trauma     Fracture toe left foot kicking a cabinet, went to , Sunday the 29th, also has blood blister same area.         HPI:   Presents to the clinic she has fracture of her left toe.  She kicked a cabinet she went to urgent care on Sunday had x-rays taken told it was fractured she also has a blood blister on the toe.  At today's visit reviewed nurse's history as taken above, allergies medications and medical history as documented below.  All changes duly noted  Allergies: Patient has no known allergies.   Current Outpatient Medications   Medication Sig Dispense Refill    rivaroxaban (XARELTO) 20 MG Oral Tab Take 1 tablet (20 mg total) by mouth daily with food.      lisinopril 20 MG Oral Tab Take 1 tablet (20 mg total) by mouth 2 (two) times daily.      levothyroxine 137 MCG Oral Tab Take 137 mcg by mouth before breakfast.      atorvastatin 20 MG Oral Tab Take 1 tablet (20 mg total) by mouth daily. 90 tablet 2    dilTIAZem HCl ER Coated Beads 180 MG Oral Capsule SR 24 Hr Take 1 capsule (180 mg total) by mouth daily. 90 capsule 3    Microlet Lancets Does not apply Misc Use one per day 100 each 3    Glucose Blood (CONTOUR NEXT TEST) In Vitro Strip Check once a day 100 each 1    metFORMIN HCl  MG Oral Tablet 24 Hr Take 1 tablet (500 mg total) by mouth 2 (two) times daily with meals. 180 tablet 2    Vitamin D3, Cholecalciferol, 50 MCG (2000 UT) Oral Cap Take 1 capsule (2,000 Units total) by mouth once daily.      PREVIDENT 5000 PLUS 1.1 % Dental Cream Take 1 Bottle by mouth As Directed.      acetaminophen 500 MG Oral Tab Take 2 tablets (1,000 mg total) by mouth every 6 (six) hours as needed for Pain.      Diclofenac Sodium 1 % External Gel Apply topically as needed.        Past Medical History:    Arrhythmia    a fib    Breast cancer ([facility])    left breast dcis    Cancer ([facility])    dcis left breast 2006    DCIS (ductal  carcinoma in situ) of breast    Exposure to medical diagnostic radiation    High blood pressure    High cholesterol    HYPERLIPIDEMIA    HYPERTENSION    MENOPAUSE    Neuropathy    OBESITY    Obesity, unspecified    Other and unspecified hyperlipidemia    SLEEP APNEA    AHI 12.8  SaO2 64%    Type II or unspecified type diabetes mellitus without mention of complication, not stated as uncontrolled    Unspecified disorder of thyroid    Unspecified sleep apnea    AHI 12.8 SaO2 aleta 64% CPAP 8 [facility]      Past Surgical History:   Procedure Laterality Date    Colonoscopy N/A 9/28/2023    Procedure: COLONOSCOPY;  Surgeon: Cisco Zamudio MD;  Location:  ENDOSCOPY    D & c  08/93    SAB    Foot/toes surgery proc unlisted  2009    broken foot pinned    Hx breast cancer  2006    DCIS    Hysterectomy  2013    Lumpectomy left  4/2006    DCIS    Needle biopsy left  12/2008    Orthopedic surg (pbp)      frx l foot/hardware    Other surgical history  2009    foot surgery/pin    Pelvis laparoscopy,dx  1997    Radiation left        Family History   Problem Relation Age of Onset    Heart Disorder Father     Cancer Father         lymphoma    Hypertension Mother     Lipids Mother     Psychiatric Mother         alzheimers    Breast Cancer Other     Heart Disorder Sister 50    Heart Disorder Brother 50      Social History     Socioeconomic History    Marital status:    Tobacco Use    Smoking status: Never    Smokeless tobacco: Never   Vaping Use    Vaping status: Never Used   Substance and Sexual Activity    Alcohol use: Yes     Alcohol/week: 0.0 - 1.0 standard drinks of alcohol     Comment: RARE    Drug use: No    Sexual activity: Yes     Partners: Male           REVIEW OF SYSTEMS:   Today reviewed systens as documented below  GENERAL HEALTH: feels well otherwise  SKIN: Refer to exam below  RESPIRATORY: denies shortness of breath with exertion  CARDIOVASCULAR: denies chest pain on exertion  GI: denies abdominal pain and denies  heartburn  NEURO: denies headaches    EXAM:   LMP 01/14/2011   GENERAL: well developed, well nourished, in no apparent distress  EXTREMITIES:   1. Integument: The skin on her left foot at the fifth toe is erythematous edematous there is no break in the skin no evidence of open fracture but the patient did take pictures of it showing that there was a fairly severe blood blister over it however that is resolving at this time.   2. Vascular: Patient has palpable pulses both dorsalis pedis and posterior tibial   3. Neurologic: Has intact sensorium   4. Musculoskeletal: Has good muscle strength she is ambulatory.  Pain on palpation of the fifth toe around the middle distal phalanges.  X-rays AP and lateral oblique were taken in the office showing a fracture of the proximal phalanx    ASSESSMENT AND PLAN:   Diagnoses and all orders for this visit:    Closed fracture of phalanx of left fifth toe, initial encounter  -     [facility] AMB POD XR - LT FOOT 2 VIEWS(AP, LATERAL)WT BEARING        Plan: Gave her a taping technique.  She will continue with the surgical shoe that she got from urgent care to make it more comfortable she can ice it take anti-inflammatory follow-up with us again in about 3 weeks we can cruz-ray at that time.  No exercise related activities at this time.  Patient should avoid strenuous activities which put more pressure on the foot in that area.    The patient indicates understanding of these issues and agrees to the plan.    Godfrey Castellanos DPM

## 2025-06-06 ENCOUNTER — TELEPHONE (OUTPATIENT)
Dept: ORTHOPEDICS CLINIC | Facility: CLINIC | Age: 65
End: 2025-06-06

## 2025-06-06 NOTE — TELEPHONE ENCOUNTER
S/w patient- She states that she cancelled this appointment because she was able to secure appointment with another podiatrist next week. She had no other questions at this time. I told her to give us a call if she has any other concerns

## 2025-06-06 NOTE — TELEPHONE ENCOUNTER
Patient scheduled on [facility] for broken toe follow up. \"Toe broken 12/29 still swollen\"  Does patient need to be seen sooner?    Future Appointments   Date Time Provider Department Center   6/26/2025  9:45 AM Godfrey Castellanos DPM [id number]

## (undated) DEVICE — 10FT COMBINED O2 DELIVERY/CO2 MONITORING. FILTER WITH MICROSTREAM TYPE LUER: Brand: DUAL ADULT NASAL CANNULA

## (undated) DEVICE — KIT VLV 5 PC AIR H2O SUCT BX ENDOGATOR CONN

## (undated) DEVICE — 1200CC GUARDIAN II: Brand: GUARDIAN

## (undated) DEVICE — ADAPTER AIR H2O CLN DISP FOR OLY GI E BIOGRD

## (undated) DEVICE — STERIS KITS

## (undated) DEVICE — 3M™ RED DOT™ MONITORING ELECTRODE WITH FOAM TAPE AND STICKY GEL, 50/BAG, 20/CASE, 72/PLT 2570: Brand: RED DOT™